# Patient Record
Sex: MALE | Race: WHITE | NOT HISPANIC OR LATINO | URBAN - METROPOLITAN AREA
[De-identification: names, ages, dates, MRNs, and addresses within clinical notes are randomized per-mention and may not be internally consistent; named-entity substitution may affect disease eponyms.]

---

## 2019-03-24 ENCOUNTER — EMERGENCY (EMERGENCY)
Facility: HOSPITAL | Age: 29
LOS: 0 days | Discharge: HOME | End: 2019-03-24
Attending: EMERGENCY MEDICINE | Admitting: EMERGENCY MEDICINE
Payer: MEDICAID

## 2019-03-24 VITALS
WEIGHT: 160.06 LBS | DIASTOLIC BLOOD PRESSURE: 92 MMHG | HEART RATE: 70 BPM | TEMPERATURE: 98 F | OXYGEN SATURATION: 100 % | SYSTOLIC BLOOD PRESSURE: 160 MMHG | HEIGHT: 69 IN | RESPIRATION RATE: 16 BRPM

## 2019-03-24 DIAGNOSIS — K62.89 OTHER SPECIFIED DISEASES OF ANUS AND RECTUM: ICD-10-CM

## 2019-03-24 DIAGNOSIS — K61.1 RECTAL ABSCESS: ICD-10-CM

## 2019-03-24 PROCEDURE — 99284 EMERGENCY DEPT VISIT MOD MDM: CPT | Mod: 25

## 2019-03-24 RX ORDER — IBUPROFEN 200 MG
600 TABLET ORAL ONCE
Qty: 0 | Refills: 0 | Status: COMPLETED | OUTPATIENT
Start: 2019-03-24 | End: 2019-03-24

## 2019-03-24 RX ORDER — CEPHALEXIN 500 MG
1 CAPSULE ORAL
Qty: 14 | Refills: 0 | OUTPATIENT
Start: 2019-03-24 | End: 2019-03-30

## 2019-03-24 RX ORDER — CEPHALEXIN 500 MG
500 CAPSULE ORAL ONCE
Qty: 0 | Refills: 0 | Status: COMPLETED | OUTPATIENT
Start: 2019-03-24 | End: 2019-03-24

## 2019-03-24 RX ADMIN — Medication 600 MILLIGRAM(S): at 15:29

## 2019-03-24 RX ADMIN — Medication 500 MILLIGRAM(S): at 15:21

## 2019-03-24 NOTE — ED STATDOCS - OBJECTIVE STATEMENT
29 y/o male with a PMHx of pilonidal cyst presents to the ED c/o perirectal abscess actively draining purulent material. No surrounding cellulitis. H/o cysts, with last pilonidal cyst a few years ago. Allergic to sulfa. Pharmacy: [facility].

## 2019-03-24 NOTE — ED STATDOCS - CLINICAL SUMMARY MEDICAL DECISION MAKING FREE TEXT BOX
Keflex and Ibuprofen for pain. Sitz baths and F/U with Colorectal. Abscess already draining on own.  Moderate amount of pus expressed.  No overlying cellulitis, no rectal pain on exam.  Keflex and Ibuprofen for pain. Sitz baths and F/U with Colorectal.

## 2019-03-24 NOTE — ED ADULT NURSE NOTE - NSIMPLEMENTINTERV_GEN_ALL_ED
Implemented All Universal Safety Interventions:  [place] to call system. Call bell, personal items and telephone within reach. Instruct patient to call for assistance. Room bathroom lighting operational. Non-slip footwear when patient is off stretcher. Physically safe environment: no spills, clutter or unnecessary equipment. Stretcher in lowest position, wheels locked, appropriate side rails in place.

## 2019-03-24 NOTE — ED ADULT NURSE NOTE - OBJECTIVE STATEMENT
pt presents to ED with abscess near rectum. pt states abscess is draining yellow fluids. afebrile. breathing is even and unlabored. will continue to monitor and reassess

## 2019-03-24 NOTE — ED ADULT TRIAGE NOTE - CHIEF COMPLAINT QUOTE
Pt states he has a cyst below his tailbone, hx of pilonidal cysts but never this low on tailbone before. Several "flare ups" over past few weeks but worse today

## 2019-05-03 ENCOUNTER — EMERGENCY (EMERGENCY)
Facility: HOSPITAL | Age: 29
LOS: 0 days | Discharge: HOME | End: 2019-05-03
Attending: EMERGENCY MEDICINE | Admitting: EMERGENCY MEDICINE
Payer: MEDICAID

## 2019-05-03 VITALS
TEMPERATURE: 98 F | RESPIRATION RATE: 20 BRPM | OXYGEN SATURATION: 100 % | DIASTOLIC BLOOD PRESSURE: 83 MMHG | HEART RATE: 84 BPM | SYSTOLIC BLOOD PRESSURE: 152 MMHG

## 2019-05-03 VITALS — HEIGHT: 69 IN | WEIGHT: 169.98 LBS
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
## 2019-05-03 DIAGNOSIS — S61.412A LACERATION WITHOUT FOREIGN BODY OF LEFT HAND, INITIAL ENCOUNTER: ICD-10-CM
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
## 2019-05-03 PROCEDURE — 73130 X-RAY EXAM OF HAND: CPT | Mod: 26,LT

## 2019-05-03 PROCEDURE — 99283 EMERGENCY DEPT VISIT LOW MDM: CPT | Mod: 25

## 2019-05-03 PROCEDURE — 29125 APPL SHORT ARM SPLINT STATIC: CPT | Mod: LT

## 2019-05-03 PROCEDURE — 12002 RPR S/N/AX/GEN/TRNK2.6-7.5CM: CPT | Mod: 59

## 2019-05-03 RX ORDER — OXYCODONE AND ACETAMINOPHEN 5; 325 MG/1; MG/1
1 TABLET ORAL ONCE
Qty: 0 | Refills: 0 | Status: DISCONTINUED | OUTPATIENT
Start: 2019-05-03 | End: 2019-05-03

## 2019-05-03 RX ORDER — CEPHALEXIN 500 MG
500 CAPSULE ORAL ONCE
Qty: 0 | Refills: 0 | Status: COMPLETED | OUTPATIENT
Start: 2019-05-03 | End: 2019-05-03

## 2019-05-03 RX ORDER — CEPHALEXIN 500 MG
1 CAPSULE ORAL
Qty: 21 | Refills: 0 | OUTPATIENT
Start: 2019-05-03 | End: 2019-05-09

## 2019-05-03 RX ADMIN — OXYCODONE AND ACETAMINOPHEN 1 TABLET(S): 5; 325 TABLET ORAL at 23:34

## 2019-05-03 NOTE — ED STATDOCS - CARE PROVIDER_API CALL
Shahbaz Curry)  Orthopaedic Surgery; Surgery of the Hand  [address]  [place] [address]  Phone: [phone]  Fax: [phone]  Follow Up Time:

## 2019-05-03 NOTE — ED STATDOCS - CLINICAL SUMMARY MEDICAL DECISION MAKING FREE TEXT BOX
28 y/o male ambulatory from urgent care to ED with laceration medial aspect base of left index finger extending to base of left thumb. neurovascularly intact. Motor, sensory exam normal. Glass broke when cleaning it. Plan: Tdap already administered at urgent care. Will x-ray left hand, PO keflex. If no foreign body, wound closure, splint and discharge on Keflex. 30 y/o male ambulatory from urgent care to ED with laceration medial aspect base of left index finger extending to base of left thumb. neurovascularly intact. Motor, sensory exam normal. Glass broke when cleaning it. Plan: Tdap already administered at urgent care. Will x-ray left hand, PO keflex. If no foreign body, wound closure, splint and discharge on Keflex. Laceration repaired, splint applied. Advised follow up with ortho-hand in 2 days. Keep splint on until that time. Remove only if thumb becomes numb, appears blue. Return to ED in event of fever, worsening pain.

## 2019-05-03 NOTE — ED PROCEDURE NOTE - CPROC ED POST PROC CARE GUIDE1
Elevate the injured extremity as instructed./Keep the cast/splint/dressing clean and dry./Instructed patient/caregiver to follow-up with primary care physician./Instructed patient/caregiver regarding signs and symptoms of infection./Verbal/written post procedure instructions were given to patient/caregiver.
Keep the cast/splint/dressing clean and dry./Instructed patient/caregiver regarding signs and symptoms of infection./Verbal/written post procedure instructions were given to patient/caregiver./Instructed patient/caregiver to follow-up with primary care physician.

## 2019-05-03 NOTE — ED STATDOCS - PROGRESS NOTE DETAILS
28 y/o M with no PMH presents with laceration to left hand. Pt states he was cleaning glass when the glass broke. Seen at urgent care, had tetanus updated, advised to go to ED for evaluation. PE: Well appearing. +4cm laceration along webbing between 1st and 2nd digit of left hand. No Obvious bony deformity or exposure. Sensation intact to light touch in all digits. Full ROM in all digits of UE. Cap refill < 2 sec in upper extremity digits. A/P: Laceration. XR to r/o FB. lac repair. Possible hand consult pending XR. - Chu Osman PA-C Laceration repaired, splint applied. Advised follow up with ortho-hand in 2 days. Keep splint on until that time. Remove only if thumb becomes numb, appears blue. Return to ED in event of fever, worsening pain. WIll d/c at this time. - Chu Osman PA-C

## 2019-05-03 NOTE — ED ADULT TRIAGE NOTE - CHIEF COMPLAINT QUOTE
Pt presents to ER s/p cutting left hand on glass. Laceration between thumb and index on left hand. Sent from urgent care

## 2019-05-03 NOTE — ED ADULT NURSE NOTE - NSIMPLEMENTINTERV_GEN_ALL_ED
Implemented All Universal Safety Interventions:  [place] to call system. Call bell, personal items and telephone within reach. Instruct patient to call for assistance. Room bathroom lighting operational. Non-slip footwear when patient is off stretcher. Physically safe environment: no spills, clutter or unnecessary equipment. Stretcher in lowest position, wheels locked, appropriate side rails in place.

## 2019-05-03 NOTE — ED STATDOCS - SKIN, MLM
skin normal color for race, warm, dry. +4cm longitudinal laceration along radial aspect of left index finger extending into webbing between index finger and thumb, slight oozing of blood. capillary refill less than 2 seconds

## 2019-05-03 NOTE — ED STATDOCS - ATTENDING CONTRIBUTION TO CARE
I, Gennaro Santana MD, personally saw the patient with the PA, and completed the key components of the history and physical exam. I then discussed the management plan with the PA.

## 2019-05-03 NOTE — ED STATDOCS - OBJECTIVE STATEMENT
30 y/o left-hand dominant male with a PMHx of pilonidal cyst presents to the ED c/o laceration to left hand today. Pt cut left hand in between thumb and index finger after accidentally breaking a glass. Pt was seen in urgent care received tetanus vaccine at urgent care,  was sent into ED because laceration is "close to the nerve." +slight numbness in fingers. No fever or any other acute complaints at this time. Allergic to sulfa. PMD- Dr. Umana.

## 2019-05-03 NOTE — ED STATDOCS - NSFOLLOWUPINSTRUCTIONS_ED_ALL_ED_FT
Laceration    WHAT YOU NEED TO KNOW:    A laceration is an injury to the skin and the soft tissue underneath it. Lacerations happen when you are cut or hit by something. They can happen anywhere on the body.     DISCHARGE INSTRUCTIONS:    Return to the emergency department if:     You have heavy bleeding or bleeding that does not stop after 10 minutes of holding firm, direct pressure over the wound.       Your wound opens up.     Contact your healthcare provider if:     You have a fever or chills.       Your laceration is red, warm, or swollen.      You have red streaks on your skin coming from your wound.      You have white or yellow drainage from the wound that smells bad.      You have pain that gets worse, even after treatment.       You have questions or concerns about your condition or care.     Medicines:     Prescription pain medicine may be given. Ask how to take this medicine safely.       Antibiotics help treat or prevent a bacterial infection.       Take your medicine as directed. Contact your healthcare provider if you think your medicine is not helping or if you have side effects. Tell him or her if you are allergic to any medicine. Keep a list of the medicines, vitamins, and herbs you take. Include the amounts, and when and why you take them. Bring the list or the pill bottles to follow-up visits. Carry your medicine list with you in case of an emergency.    Care for your wound as directed:     Do not get your wound wet until your healthcare provider says it is okay. Do not soak your wound in water. Do not go swimming until your healthcare provider says it is okay. Carefully wash the wound with soap and water. Gently pat the area dry or allow it to air dry.       Change your bandages when they get wet, dirty, or after washing. Apply new, clean bandages as directed. Do not apply elastic bandages or tape too tight. Do not put powders or lotions over your incision.       Apply antibiotic ointment as directed. Your healthcare provider may give you antibiotic ointment to put over your wound if you have stitches. If you have strips of tape over your incision, let them dry up and fall off on their own. If they do not fall off within 14 days, gently remove them. If you have glue over your wound, do not remove or pick at it. If your glue comes off, do not replace it with glue that you have at home.       Check your wound every day for signs of infection such as swelling, redness, or pus.     Self-care:     Apply ice on your wound for 15 to 20 minutes every hour or as directed. Use an ice pack, or put crushed ice in a plastic bag. Cover it with a towel. Ice helps prevent tissue damage and decreases swelling and pain.      Use a splint as directed. A splint will decrease movement and stress on your wound. It may help it heal faster. A splint may be used for lacerations over joints or areas of your body that bend. Ask your healthcare provider how to apply and remove a splint.       Decrease scarring of your wound by applying ointments as directed. Do not apply ointments until your healthcare provider says it is okay. You may need to wait until your wound is healed. Ask which ointment to buy and how often to use it. After your wound is healed, use sunscreen over the area when you are out in the sun. You should do this for at least 6 months to 1 year after your injury.     Follow up with your healthcare provider as directed: You may need to follow up in 24 to 48 hours to have your wound checked for infection. You will need to return in 3 to 14 days if you have stitches or staples so they can be removed. Care for your wound as directed to prevent infection and help it heal. Write down your questions so you remember to ask them during your visits.     Advised follow up with ortho-hand in 2 days. Keep splint on until that time. Remove only if thumb becomes numb, appears blue. Return to ED in event of fever, worsening pain.

## 2019-05-03 NOTE — ED ADULT NURSE NOTE - OBJECTIVE STATEMENT
pt presents to ED from Urgent Care for lacerations to the L. hand between the index finger and thumb. 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 reports receiving a tetanus booster and PCN at urgent care PTA. cap refill in L. hand <2sec, no not c/o of pain when moving fingers at this time.

## 2019-05-03 NOTE — ED STATDOCS - MUSCULOSKELETAL, MLM
range of motion is not limited and there is no muscle tenderness. AFROM PIP and DIP with normal strength

## 2019-05-04 PROBLEM — L05.91 PILONIDAL CYST WITHOUT ABSCESS: Chronic | Status: ACTIVE | Noted: 2019-03-24

## 2020-12-10 NOTE — ED ADULT NURSE NOTE - PAIN RATING/NUMBER SCALE (0-10): ACTIVITY
7 Ivermectin Counseling:  Patient instructed to take medication on an empty stomach with a full glass of water.  Patient informed of potential adverse effects including but not limited to nausea, diarrhea, dizziness, itching, and swelling of the extremities or lymph nodes.  The patient verbalized understanding of the proper use and possible adverse effects of ivermectin.  All of the patient's questions and concerns were addressed.